# Patient Record
Sex: MALE | Race: BLACK OR AFRICAN AMERICAN | NOT HISPANIC OR LATINO | URBAN - METROPOLITAN AREA
[De-identification: names, ages, dates, MRNs, and addresses within clinical notes are randomized per-mention and may not be internally consistent; named-entity substitution may affect disease eponyms.]

---

## 2017-03-09 ENCOUNTER — EMERGENCY (EMERGENCY)
Facility: HOSPITAL | Age: 62
LOS: 1 days | Discharge: HOME | End: 2017-03-09
Attending: EMERGENCY MEDICINE | Admitting: EMERGENCY MEDICINE
Payer: SELF-PAY

## 2017-03-09 VITALS
DIASTOLIC BLOOD PRESSURE: 62 MMHG | OXYGEN SATURATION: 98 % | TEMPERATURE: 98 F | HEART RATE: 92 BPM | RESPIRATION RATE: 16 BRPM | SYSTOLIC BLOOD PRESSURE: 146 MMHG

## 2017-03-09 VITALS
TEMPERATURE: 99 F | HEART RATE: 101 BPM | SYSTOLIC BLOOD PRESSURE: 147 MMHG | DIASTOLIC BLOOD PRESSURE: 87 MMHG | RESPIRATION RATE: 16 BRPM | OXYGEN SATURATION: 96 %

## 2017-03-09 PROCEDURE — 99283 EMERGENCY DEPT VISIT LOW MDM: CPT | Mod: 25

## 2017-03-09 PROCEDURE — 99053 MED SERV 10PM-8AM 24 HR FAC: CPT

## 2017-03-09 NOTE — ED PROVIDER NOTE - OBJECTIVE STATEMENT
62 yr old male who states "I am from the streets".  States no to every question I asked but states is here to rest and wants something to eat.  Denies any medical complaints such as chest pain, shortness of breath.

## 2017-03-09 NOTE — ED ADULT NURSE NOTE - CHIEF COMPLAINT QUOTE
Pt picked up by EMS on [facility].  EMS called by bystander.  Pt has no complaints, only history he can provide is TBI from falling off a bridge 30+ years ago.  Pt calm in triage.

## 2017-03-09 NOTE — ED ADULT TRIAGE NOTE - CHIEF COMPLAINT QUOTE
Pt picked up by EMS on [facility].  EMS called by bystander.  Pt has no complaints, only history he can provide is TBI from falling off a bridge 30+ years ago.  Pt calm in triage. Pt picked up by EMS on [facility].  EMS called by bystander.  Pt has no complaints, only history he can provide is TBI from falling off a bridge 30+ years ago.  Pt calm in triage.

## 2017-03-09 NOTE — ED ADULT NURSE NOTE - OBJECTIVE STATEMENT
Patient a&ox4, ambulatory, brought by EMS. Patient states brought in as he needs help, is homeless. States hx of schizophrenia, seen at [place] "recently". States last medications 3 weeks ago. Reports no pain or any medical complaints. Patient states no hallucinations at this time. Social work to come in at 8am, charge nurse aware. Call bell in reach. Patient resting comfortably in stretcher, watching TV. Will continue to monitor. Patient a&ox4, ambulatory with cane, brought by EMS. Patient states brought in as he needs help, is homeless. States hx of schizophrenia, seen at [facility] "recently". States last medications 3 weeks ago. Reports no pain or any medical complaints. Patient states no hallucinations at this time. Social work to come in at 8am, charge nurse aware. Call bell in reach. Patient resting comfortably in stretcher, watching TV. Will continue to monitor.

## 2017-03-09 NOTE — ED ADULT NURSE REASSESSMENT NOTE - NS ED NURSE REASSESS COMMENT FT1
Patient ambulated to bathroom with cane, observed to have extreme difficulty ambulating a couple feet. Patient states this is his baseline. Patient assisted with wheelchair to bathroom, MD Scherer aware. Will continue to monitor.

## 2018-02-10 ENCOUNTER — EMERGENCY (EMERGENCY)
Facility: HOSPITAL | Age: 63
LOS: 0 days | Discharge: HOME | End: 2018-02-10
Attending: EMERGENCY MEDICINE
Payer: SELF-PAY

## 2018-02-10 VITALS
HEART RATE: 95 BPM | OXYGEN SATURATION: 98 % | SYSTOLIC BLOOD PRESSURE: 143 MMHG | RESPIRATION RATE: 18 BRPM | WEIGHT: 160.06 LBS | TEMPERATURE: 98 F | HEIGHT: 72 IN | DIASTOLIC BLOOD PRESSURE: 91 MMHG

## 2018-02-10 DIAGNOSIS — R25.2 CRAMP AND SPASM: ICD-10-CM

## 2018-02-10 DIAGNOSIS — M79.661 PAIN IN RIGHT LOWER LEG: ICD-10-CM

## 2018-02-10 PROCEDURE — 99053 MED SERV 10PM-8AM 24 HR FAC: CPT

## 2018-02-10 PROCEDURE — 99283 EMERGENCY DEPT VISIT LOW MDM: CPT | Mod: 25

## 2018-02-10 RX ORDER — ACETAMINOPHEN 500 MG
650 TABLET ORAL ONCE
Qty: 0 | Refills: 0 | Status: COMPLETED | OUTPATIENT
Start: 2018-02-10 | End: 2018-02-10

## 2018-02-10 NOTE — ED PROVIDER NOTE - MEDICAL DECISION MAKING DETAILS
62 yo M with LE cramps  -tylenol, rest, sandwich, d/c in AM, pcp f/u, will give shelter referral  -counseled patient on drug and alcohol abuse

## 2018-02-10 NOTE — ED PROVIDER NOTE - PROGRESS NOTE DETAILS
Pt. reports feeling better after meds  pt. agrees to f/u with primary care outpt.  pt. understands to return to ED if symptoms worsen; will d/c

## 2018-02-10 NOTE — ED ADULT NURSE NOTE - OBJECTIVE STATEMENT
64 y/o male denies PMHx presents to the ED with b/l leg pain, patient states the pain went away, I just would like soemthing to eat and drink, I tried to go to a shelter tonight and I didn't.

## 2018-02-10 NOTE — ED PROVIDER NOTE - OBJECTIVE STATEMENT
62 yo M with R LE cramping.  Pt. says he always has it.  he asks for a sandwich and a TV to watch.  No other complains.   ROS: negative for fever, cough, headache, chest pain, shortness of breath, abd pain, nausea, vomiting, diarrhea, rash, paresthesia, and weakness.   PMH: negative; Meds: thorazine; SH: Denies smoking/drinking/drug use

## 2018-02-10 NOTE — ED PROVIDER NOTE - PHYSICAL EXAMINATION
Vitals: WNL  Gen: AAOx3, NAD, sitting comfortably in stretcher  Head: ncat, perrla, eomi b/l  Neck: supple, no lymphadenopathy, no midline deviation  Heart: rrr, no m/r/g  Lungs: CTA b/l, no rales/ronchi/wheezes  Abd: soft, nontender, non-distended, no rebound or guarding  Ext: no clubbing/cyanosis/edema, no tenderness to palpation, b/l LE venous stasis, no evidence for clots   Neuro: sensation and muscle strength intact b/l, steady gait

## 2018-04-25 ENCOUNTER — EMERGENCY (EMERGENCY)
Facility: HOSPITAL | Age: 63
LOS: 1 days | Discharge: HOME | End: 2018-04-25
Attending: EMERGENCY MEDICINE | Admitting: EMERGENCY MEDICINE
Payer: MEDICAID

## 2018-04-25 VITALS
TEMPERATURE: 98 F | OXYGEN SATURATION: 100 % | DIASTOLIC BLOOD PRESSURE: 99 MMHG | RESPIRATION RATE: 18 BRPM | SYSTOLIC BLOOD PRESSURE: 168 MMHG | HEART RATE: 70 BPM

## 2018-04-25 PROCEDURE — 99053 MED SERV 10PM-8AM 24 HR FAC: CPT

## 2018-04-25 PROCEDURE — 99282 EMERGENCY DEPT VISIT SF MDM: CPT | Mod: 25

## 2018-04-25 NOTE — ED ADULT NURSE REASSESSMENT NOTE - NS ED NURSE REASSESS COMMENT FT1
pt a/ox4, vitally stable, ambulatory to restroom with no difficulty, in NAD, repeat  trop sent
pt d/c as per EDMD, pt ambulatory at time of DC, no belongings left in room,
pt refused for me to do vital signs, pt in NAD, awake, speaking in full sentences, no SOB noted, pt denies any Pain at this time, states "I am fine I don't need that"

## 2018-04-25 NOTE — ED PROVIDER NOTE - OBJECTIVE STATEMENT
63M per triage note "Passerby called EMS after seeing pt wandering in street. Pt not answering questions appropriately in triage. Calm and cooperative. Homeless and reports mother kicked him out of the house tonight."  Per EMS, [place] from [facility] after passerby called about pt wandering in the street. Pt not forthcoming. Pt has several prior ED visits. Has no documented psych hx (also d/w w/ psych who looked in their database). 63M per triage note "Passerby called EMS after seeing pt wandering in street. Pt not answering questions appropriately in triage. Calm and cooperative. Homeless and reports mother kicked him out of the house tonight."  Per EMS, pt picked up from gas station after passerby called about pt wandering in the street. Pt not forthcoming. Pt has several prior ED visits. Has no documented psych hx (also d/w w/ psych who looked in their database). Pt occasionally rambling but otherwise answering some questions. Denying any pain anywhere or acute medical complaints. States [place] lives on the streets. Attempted to call phone number listed in chart but get a busy signal.

## 2018-04-25 NOTE — ED PROVIDER NOTE - MEDICAL DECISION MAKING DETAILS
63M no known PMH, homeless presents after passerby called bec pt seemed to be wandering in the streets. Pt not that forthcoming, denies any acute complaints. Vitals wnl, exam as above. Well appearing.  ddx: Likely pt's baseline based on prior charts. Clinically no acute psych complaints or exam findings to warrant emergent ED psych consult.  Will observe in ED, feed, reassess.

## 2018-04-25 NOTE — PROVIDER CONTACT NOTE (OTHER) - ASSESSMENT
Pt sitting in waiting area.  pt appeared agitated when spoken to in the beginning but soon calmed down.  Pt given bkft and sandwich to take, two metro cards and list of shelter intake.  swk also got pt a sweat shirt.  Pt left after eating.

## 2018-04-25 NOTE — ED PROVIDER NOTE - PROGRESS NOTE DETAILS
[facility]: PT given food. Very pleasant. Observed in ED for several hrs, has no complaints. Does not want SW or list of shelters, comfortable for dc. [employer]: PT given food. Very pleasant. Observed in ED for several hrs, has no complaints. Does not want SW or list of shelters, comfortable for dc. steady gait w/ 2 canes like at his baseline (per pt).

## 2018-04-25 NOTE — ED ADULT TRIAGE NOTE - CHIEF COMPLAINT QUOTE
Radha called EMS after seeing pt wandering in street. Pt not answering questions appropriately in triage. Calm and cooperative. Homeless and reports mother kicked him out of the house tonight.

## 2018-04-25 NOTE — ED ADULT NURSE NOTE - OBJECTIVE STATEMENT
as per triage note "Passerby called EMS after seeing pt wandering in street. Pt not answering questions appropriately in triage. Calm and cooperative. Homeless and reports mother kicked him out of the house tonight." pt not providing any other information, pt awake, in NAD, currently eating a sandwich, pt denies any medical complaints at this time, will continue to monitor

## 2019-03-12 ENCOUNTER — EMERGENCY (EMERGENCY)
Facility: HOSPITAL | Age: 64
LOS: 1 days | Discharge: HOME | End: 2019-03-12
Attending: EMERGENCY MEDICINE | Admitting: EMERGENCY MEDICINE
Payer: SELF-PAY

## 2019-03-12 VITALS
HEART RATE: 76 BPM | SYSTOLIC BLOOD PRESSURE: 153 MMHG | TEMPERATURE: 97 F | DIASTOLIC BLOOD PRESSURE: 94 MMHG | RESPIRATION RATE: 16 BRPM | OXYGEN SATURATION: 100 %

## 2019-03-12 PROCEDURE — 99053 MED SERV 10PM-8AM 24 HR FAC: CPT

## 2019-03-12 PROCEDURE — 99283 EMERGENCY DEPT VISIT LOW MDM: CPT | Mod: 25

## 2019-03-12 NOTE — ED ADULT TRIAGE NOTE - CHIEF COMPLAINT QUOTE
as per ems "patient flagged down ems on street c/o of leg pain x 12 months and right arm pain, in ambulance started endorsing relief of pain." Has large mass over right elbow that he states has "been there for a while."  Patient also endorsing he is homeless. Hx DM

## 2019-03-12 NOTE — ED PROVIDER NOTE - OBJECTIVE STATEMENT
64M with no pmh presenting with complaint of left leg pain for "years". Patient flagged down EMS on street for transport to hospital. Patient known to be homeless. Patient upon arrival stating no complaints and wants to sleep. Denies any other symptoms.

## 2019-03-12 NOTE — ED PROVIDER NOTE - ATTENDING CONTRIBUTION TO CARE
Pt with chronic BLE pains for years.  Flagged down EMS to come to ED.  No acute complaints    Gen:  Well appearning in NAD  Head:  NC/AT  Resp: No distress   Ext: no deformities  Skin: warm and dry as visualized     pt here with chronic pain and no acute medical complaints.  On further questioning stated he really just wants a place to sleep.  Will allow to sleep until sun comes up.  Offered social work and he declined

## 2019-03-12 NOTE — ED PROVIDER NOTE - CLINICAL SUMMARY MEDICAL DECISION MAKING FREE TEXT BOX
pt here with chronic pain and no acute medical complaints.  On further questioning stated he really just wants a place to sleep.  Will allow to sleep until sun comes up.  Offered social work and he declined

## 2019-03-12 NOTE — ED ADULT NURSE NOTE - OBJECTIVE STATEMENT
pt received in room 17, A&Ox3, complaining of left leg pain for "years." pt denying any complaints, just "wants to sleep", will continue to monitor.

## 2020-07-08 NOTE — ED ADULT TRIAGE NOTE - NSPATIENTFLAG_GEN_A_ER
Occupational Therapy Evaluation    ASSESSMENT:   Patient seen on ICU nursing unit. Patient presents below baseline which was modified independent with functional household mobility and activities of daily living. Patient admitted with GI bleed. He had an EGD completed. Patient confused throughout session, however patient reports living with his wife and adult children, not using device for mobility, and completing self-cares independently. Question accuracy of information. For safe return to prior living situation the patient needs to be at a modified independent  level for ADLs.     The patient now presents with impairments in activity tolerance, balance, cognitive changes, limited knowledge of compensatory strategies, pain, postural control, ROM, safety awareness and strength which impact safe and effective performance ingrooming, bathing, dressing, toileting, functional transfers/mobility, cooking, cleaning, laundry, driving/community mobility, shopping, work/education/volunteering and social participation. Patient is currently functioning at maximal assist  for ADL skills and total assist for IADLs. Nursing staff transitioning patient from supine to seated EOB upon arrival. Patient able to rise from bed with WW and min A x 1 however requires increased assist to pivot to chair due to difficulty following directions and pushing WW far out in front of himself. Second person present for safety. Patient requires max A for changing of gown. Patient does not appear safe to return home therefore MARY placement anticipitated at [place].     Further skilled occupational therapy services are reasonable and necessary to address the above impairments and performance deficits to maximize the patient's independence    OT Identified Barriers to Discharge: fall risk, cognitive deficits      PLAN AND RECOMMENDATIONS:   Recommendations for Discharge:  Recommendations for Discharge: OT WI: Sub-acute nursing home      Plan:   Continue  skilled OT, including the following Treatment Interventions: ADL retraining;Functional transfer training;UE strengthening/ROM;Endurance training;Cognitive reorientation;Patient/Family training;Equipment eval/education;Neuro muscular reeducation;Fine motor coordination activities;Compensatory technique education (07/08/20 1525)    OT Frequency: Once a day;7 days/week (07/08/20 1525)     Treatment Plan for Next Session: co-treat with PT to progress mobility, seated ADLs                 EQUIPMENT:   PT/OT ADL Equipment for Discharge: continue to assess (07/08/20 1525)  PT/OT Mobility Equipment for Discharge: continue to assess (07/08/20 1525)     DIAGNOSIS:   No diagnosis found.       PRECAUTIONS:   Precautions  Other Precautions: fall risk        EDUCATION:   On this date, the patient was educated on role of OT and plan of care  and safe use of equipment.  The response to education was: No evidence of learning.     SUBJECTIVE:   Subjective: Pt agreeable to session. \"Where am I going?\" (07/08/20 1525)       OBJECTIVE:   Self Cares/ADLs:   Self Cares/ADL's  Upper Body Dressing Assistance: Maximal Assist (Max) (07/08/20 1525)  Upper Body Dressing Deficit: Thread RUE;Thread LUE;Pull around back (07/08/20 1525)  Self Cares/ADL's Comments #1: Max A to eduardo leyvan. Deferred additional ADLs this date  (07/08/20 1525)      Household Mobility:    Household Mobility  Sit to Stand: Minimal Assist (Min) (07/08/20 1525)  Stand to Sit: Moderate Assist (Mod) (07/08/20 1525)  Stand Pivot Transfers: Moderate Assist (Mod) (07/08/20 1525)  Transfer Equipment: 2ww, gait belt (07/08/20 1525)  Sitting - Static: Supervision (07/08/20 1525)  Sitting - Dynamic: Touching/Steadying Assistance (07/08/20 1525)  Standing - Static: Minimal Assist (Min) (07/08/20 1525)  Standing - Dynamic: Moderate Assist (Mod) (07/08/20 1525)  Household Mobility Comments #1: Pt requires A x 2 for bed mobility with nursing staff. Stands with min A x 1 and 2nd person  present for safety. Pivots to chair with WW and moderate A x 1 due to pushing WW in front of him and sitting before being fully turned.  (07/08/20 1525)    Home Management:    not addressed during today's OT session     GOALS:   Short Term Goals to Be Reviewed On: 07/15/20 (07/08/20 1525)  Short Term Goals Are The Same as Discharge Goals: No (07/08/20 1525)  Goal Agreement: Patient agrees with goals and treatment plan (07/08/20 1525)  Grooming Short Term Goal 1: set-up, seated (07/08/20 1525)  Grooming Discharge Goal 1: modified independent standing at sink (07/08/20 1525)  Bathing Short Term Goal 1: moderate A x 1, seated (07/08/20 1525)  Bathing Discharge Goal 1: supervision, seated (07/08/20 1525)  Dressing Short Term Goal 1: min A upper body  (07/08/20 1525)  Dressing Discharge Goal 1: modified independent upper body  (07/08/20 1525)  Dressing Short Term Goal 2: moderate A x 1 lower body with AE use prn  (07/08/20 1525)  Dressing Discharge Goal 2: modified independent lower body with use of AE prn  (07/08/20 1525)  Toileting Short Term Goal 1: max A x 1 (07/08/20 1525)  Toileting Discharge Goal 1: modified independent (07/08/20 1525)  Home Setting Transfer Short Term Goal 1: toilet transfer min A x 1 (07/08/20 1525)  Home Setting Transfer Discharge Goal 1: toilet transfer modified independence (07/08/20 1525)       EVALUATION CODE JUSTIFICATION:   Eval Code:  $ OT Eval: Moderate Complexity: 1 Procedure  Problems/Co-morbidities:   Patient Active Problem List   Diagnosis   • Benign essential HTN   • Increased urinary frequency   • Hyperlipidemia   • ASHD (arteriosclerotic heart disease)   • AF (atrial fibrillation) (CMS/HCC)   • History of total hip arthroplasty   • Vitamin B 12 deficiency     Personal Occupations Profile Affected: -------ADL, -------IADL  Task Modification: Minimal to moderate task modification  Clinical decision making: Moderate - Patient has several limitations (3-5), comorbidities and/or  complexities, as noted in detailed assessment above, that impact their occupational profile.  Resulting in several treatment options and minimal to moderate task modification consistent with moderate clinical decision making complexity.      BILLING INFORMATION:   Total Treatment Time: OT Time Spent: 40 minutes   Timed Treatment Minutes:        Green (Altered Mental Status/Behavior)

## 2023-04-21 NOTE — ED ADULT NURSE NOTE - NS PRO PASSIVE SMOKE EXP
Outreach attempt was made to schedule a Medicare Wellness Visit. This was the first attempt. Contact was made, MWV appointment scheduled.5/18/23    
PSR: Patient is due for MWV. Please call patient and schedule accordingly.     Thank you!       In an effort to ensure that our patients [employer]Well, a Team Member has reviewed your chart and identified an opportunity to provide the best care possible. An attempt was made to discuss or schedule overdue Preventive or Disease Management screening.     The Outcome was Contact was not made, letter/portal message sent If you have any questions or need help with scheduling, contact your primary care provider.. Care Gaps include Wellness Visits.      
Unknown
